# Patient Record
Sex: FEMALE | Race: WHITE
[De-identification: names, ages, dates, MRNs, and addresses within clinical notes are randomized per-mention and may not be internally consistent; named-entity substitution may affect disease eponyms.]

---

## 2020-03-25 ENCOUNTER — HOSPITAL ENCOUNTER (EMERGENCY)
Dept: HOSPITAL 62 - ER | Age: 45
Discharge: HOME | End: 2020-03-25
Payer: SELF-PAY

## 2020-03-25 VITALS — DIASTOLIC BLOOD PRESSURE: 70 MMHG | SYSTOLIC BLOOD PRESSURE: 145 MMHG

## 2020-03-25 DIAGNOSIS — N64.4: Primary | ICD-10-CM

## 2020-03-25 DIAGNOSIS — Z88.3: ICD-10-CM

## 2020-03-25 DIAGNOSIS — F17.210: ICD-10-CM

## 2020-03-25 DIAGNOSIS — Z80.3: ICD-10-CM

## 2020-03-25 PROCEDURE — 76642 ULTRASOUND BREAST LIMITED: CPT

## 2020-03-25 PROCEDURE — 99283 EMERGENCY DEPT VISIT LOW MDM: CPT

## 2020-03-25 NOTE — ER DOCUMENT REPORT
ED Medical Screen (RME)





- General


Chief Complaint: Skin Problem


Stated Complaint: BREAST PROBLEM


Time Seen by Provider: 03/25/20 15:48


Mode of Arrival: Ambulatory


Information source: Patient


Notes: 





45-year-old female presents with complaints of right breast abscess.  Reports 

she had a breast abscess approximately 1-1/2 years ago.  She reports it was 

drained in [place].  She reports 4 days ago she started having some breast 

irritation tenderness.  She reports her breast started turning red last night. 

She did soak the breast, put warm compresses on the breast and no relief of 

symptoms. She denies nipple drainage. No c/o fever, vomiting, diarrhea.  She 

reports she does not have a primary care provider and does not have insurance.











 





- Related Data


Allergies/Adverse Reactions: 


                                        





cariprazine [From Vraylar] Allergy (Verified 03/25/20 15:48)


   


clonazepam [From Klonopin] Allergy (Verified 03/25/20 15:48)


   


prednisone Allergy (Verified 03/25/20 15:48)


   


sulfamethoxazole [From Bactrim] Allergy (Verified 03/25/20 15:48)


   


trimethoprim [From Bactrim] Allergy (Verified 03/25/20 15:48)


   











Past Medical History





- General


Information source: Patient





- Social History


Cigarette use (# per day): Yes


Chew tobacco use (# tins/day): No


Frequency of alcohol use: None


Drug Abuse: None


Lives with: Family - 


Family history: Malignancy - great great aunt with breast cancer


Pulmonary Medical History: Reports: Other - emphysema


Past Surgical History: Reports: Other - right breast abscess drained





Review of Systems





- Review of Systems


Notes: 





Review HPI for review of systems., All other systems negative





Physical Exam





- Vital signs


Vitals: 


                                        











Temp Pulse Resp BP Pulse Ox


 


 98.0 F   92   18   185/96 H  100 


 


 03/25/20 15:37  03/25/20 15:37  03/25/20 15:37  03/25/20 15:37  03/25/20 15:37














- Notes


Notes: 





PHYSICAL EXAMINATION: 


GENERAL: Well-appearing and in no acute distress 


HEAD: Atraumatic, normocephalic. 


EYES: extraocular movements intact, sclera anicteric, conjunctiva are normal. 


ENT: nares patent,  Moist mucous membranes. 


NECK: Normal range of motion, supple 


LUNGS:RR even/unlabored


HEART: Regular rate 


ABDOMEN: Soft, no tenderness. 


EXTREMITIES: Normal range of motion, 


NEUROLOGICAL: Cranial nerves grossly intact.  


PSYCH: Normal mood, normal affect. 


SKIN: Warm, Dry, normal turgor, no rashes or lesions noted


Right breast ttp, no obvious breast abscess, no visual nipple discharge, no 

induration








Course





- Re-evaluation


Re-evalutation: 





03/25/20 17:15





Dr Weinstein from radiology contacted me.  There is concern about inflammatory 

tissue versus abscess..  Patient reports her great great aunt had breast cancer.

 Patient herself has never had a mammogram.  Patient was instructed on 

ultrasound report.  Instructed on follow-up for mammogram tomorrow morning.  It 

was scheduled through Maty in radiology.  Concerned about follow-up post 

mammogram.  Patient does not have a primary care provider she does not have 

insurance.  I have left a message with the culture nurse to monitor results and 

contact me.  The patient was instructed to return to ER for worsening erythema 

swelling warmth pain.





                                        





Breast Ultrasound  03/25/20 16:00


IMPRESSION:   Inflammatory tissue without obvious abscess.  Correlation with 

mammography is recommended.


 











 








- Vital Signs


Vital signs: 


                                        











Temp Pulse Resp BP Pulse Ox


 


 98.0 F   80   18   145/70 H  98 


 


 03/25/20 17:19  03/25/20 17:19  03/25/20 17:19  03/25/20 17:19  03/25/20 17:19














- Diagnostic Test


Radiology reviewed: Image reviewed, Reports reviewed





Doctor's Discharge





- Discharge


Clinical Impression: 


 breast irritation





Condition: Stable


Disposition: HOME, SELF-CARE


Instructions:  Mammography ([facility]), Oral Narcotic Medication ([facility])


Additional Instructions: 


*You have been evaluated for a right breast abscess


*The [facility]  noted a possible abscess or inflammatory response.  You need 

further evaluation via mammogram


*Monitor your breast for signs of increasing infection such as increasing pain, 

redness, swelling, warmth


*warm compresses


*Follow up with a primary care provider for referral to surgeon


*Follow up for a mammogram tomorrow at [facility] at 0845 tomorrow 

morning.  


*Return to ED for signs of worsening infection, worsening condition, concerns, 

changes, needs


Forms:  Elevated Blood Pressure, Follow-Up Radiology Testing

## 2020-03-25 NOTE — RADIOLOGY REPORT (SQ)
EXAM DESCRIPTION:  U/S BREAST UNILATERAL LIMITED



COMPLETED DATE/TIME:  3/25/2020 4:31 pm



REASON FOR STUDY:  breast abscess



COMPARISON:  None



TECHNIQUE:  Static and Realtime grayscale interrogation of focal area(s) of concern in the right danae
st(s) acquired.  Selected color doppler/spectral images saved to PACS.



LIMITATIONS:  None.



FINDINGS:  There is a subareolar hypoechoic heterogeneous area measuring about 1.5 cm in maximum diam
eter.  There is flow in the periphery and no through transmission.



IMPRESSION:   Inflammatory tissue without obvious abscess.  Correlation with mammography is recommend
ed.



BIRAD:  0 Incomplete: Need additional imaging evaluation and/or prior mammograms for comparison..



RECOMMENDATION:  RECOMMENDED FOLLOW-UP: Follow-up  as clinically indicated.



COMMENT:  The American College of Radiology (ACR) has developed recommendations for screening MRI of 
the breasts in certain patient populations, to be used in conjunction with mammography.  Breast MRI s
urveillance may be appropriate for women with more than 20% lifetime risk of developing breast cancer
  as determined by genetic testing, significant family history of the disease, or history of mantle r
adiation for Hodgkins Disease.  ACR Practice Guidelines 2008.



TECHNICAL DOCUMENTATION:  FINDING NUMBER: (1)

ASSESSMENT: (1)

JOB ID:  [id number]

 2011 [employer]- All Rights Reserved



Reading location - IP/workstation name: [email or web address]

## 2020-03-25 NOTE — ER DOCUMENT REPORT
HPI





- HPI


Patient complains to provider of: right breast abscess


Time Seen by Provider: 03/25/20 15:48


Onset: Other - 4 days


Quality of pain: Achy


Context: 





45-year-old female presents with complaints of right breast abscess.  Reports 

she had a breast abscess approximately 1-1/2 years ago.  She reports it was 

drained in [place].  She reports 4 days ago she started having some breast 

irritation tenderness.  She reports her breast started turning red last night. 

She did soak the breast, put warm compresses on the breast and no relief of 

symptoms. She denies nipple drainage. No c/o fever, vomiting, diarrhea.  She 

reports she does not have a primary care provider and does not have insurance.





Past Medical History





- General


Information source: Patient





- Social History


Smoking Status: Current Every Day Smoker


Cigarette use (# per day): Yes


Frequency of alcohol use: None


Drug Abuse: None


Family History: None


Patient has suicidal ideation: No


Patient has homicidal ideation: No


Pulmonary Medical History: Reports: Other - emphysema


Past Surgical History: Reports: Other - right breast abscess drained





Vertical Provider Document





- CONSTITUTIONAL


Agree With Documented VS: Yes


Exam Limitations: No Limitations


General Appearance: WD/WN, No Apparent Distress





- HEENT


HEENT: Atraumatic, Normocephalic





- NECK


Neck: Supple





- RESPIRATORY


Respiratory: No Respiratory Distress





- CARDIOVASCULAR


Cardiovascular: Regular Rate





- MUSCULOSKELETAL/EXTREMETIES


Musculoskeletal/Extremeties: MAEW, FROM





- NEURO


Level of Consciousness: Awake, Alert, Appropriate


Motor/Sensory: No Motor Deficit





- DERM


Integumentary: Warm, Dry, Abscess


Adult Front & Back Diagram: 


                            __________________________














                            __________________________





 1 - Patient complains of right breast pain.  Reports it feels like an abscess 

that she had 11/2 years ago.  No obvious abscess noted no erythema no swelling 

no pustule.  No drainage








Course





- Re-evaluation


Re-evalutation: 





03/25/20 16:10


Patient presents to the emergency department with complaints of right breast 

abscess.  I did evaluate the breast it does not look to have an obvious abscess.

 Ultrasound ordered





- Vital Signs


Vital signs: 


                                        











Temp Pulse Resp BP Pulse Ox


 


 98.0 F   92   18   185/96 H  100 


 


 03/25/20 15:37  03/25/20 15:37  03/25/20 15:37  03/25/20 15:37  03/25/20 15:37














Discharge





- Discharge


Clinical Impression: 


 breast irritation





Condition: Stable


Disposition: HOME, SELF-CARE


Additional Instructions: 


*You have been evaluated for a right breast abscess


*Monitor the site for signs of increasing infection such as increasing pain,


  redness, swelling, warmth


*Wash the site twice daily as discussed


*Follow up with a primary care provider in 3-5 days


*Return to ED for signs of increasing infection, worsening condition,


  changes, needs

## 2020-03-26 ENCOUNTER — HOSPITAL ENCOUNTER (OUTPATIENT)
Dept: HOSPITAL 62 - WI | Age: 45
End: 2020-03-26
Attending: NURSE PRACTITIONER
Payer: SELF-PAY

## 2020-03-26 ENCOUNTER — HOSPITAL ENCOUNTER (EMERGENCY)
Dept: HOSPITAL 62 - ER | Age: 45
Discharge: HOME | End: 2020-03-26
Payer: SELF-PAY

## 2020-03-26 VITALS — DIASTOLIC BLOOD PRESSURE: 108 MMHG | SYSTOLIC BLOOD PRESSURE: 158 MMHG

## 2020-03-26 DIAGNOSIS — Z88.8: ICD-10-CM

## 2020-03-26 DIAGNOSIS — R74.0: ICD-10-CM

## 2020-03-26 DIAGNOSIS — Z88.1: ICD-10-CM

## 2020-03-26 DIAGNOSIS — N61.0: Primary | ICD-10-CM

## 2020-03-26 DIAGNOSIS — N64.89: Primary | ICD-10-CM

## 2020-03-26 DIAGNOSIS — I10: ICD-10-CM

## 2020-03-26 LAB
ADD MANUAL DIFF: NO
ALBUMIN SERPL-MCNC: 4.7 G/DL (ref 3.5–5)
ALP SERPL-CCNC: 89 U/L (ref 38–126)
ANION GAP SERPL CALC-SCNC: 12 MMOL/L (ref 5–19)
AST SERPL-CCNC: 20 U/L (ref 14–36)
BASOPHILS # BLD AUTO: 0.1 10^3/UL (ref 0–0.2)
BASOPHILS NFR BLD AUTO: 0.9 % (ref 0–2)
BILIRUB DIRECT SERPL-MCNC: 0.3 MG/DL (ref 0–0.4)
BILIRUB SERPL-MCNC: 0.3 MG/DL (ref 0.2–1.3)
BUN SERPL-MCNC: 8 MG/DL (ref 7–20)
CALCIUM: 9.9 MG/DL (ref 8.4–10.2)
CHLORIDE SERPL-SCNC: 103 MMOL/L (ref 98–107)
CO2 SERPL-SCNC: 25 MMOL/L (ref 22–30)
EOSINOPHIL # BLD AUTO: 0.1 10^3/UL (ref 0–0.6)
EOSINOPHIL NFR BLD AUTO: 1 % (ref 0–6)
ERYTHROCYTE [DISTWIDTH] IN BLOOD BY AUTOMATED COUNT: 13.2 % (ref 11.5–14)
GLUCOSE SERPL-MCNC: 154 MG/DL (ref 75–110)
HCT VFR BLD CALC: 43.4 % (ref 36–47)
HGB BLD-MCNC: 14.4 G/DL (ref 12–15.5)
LYMPHOCYTES # BLD AUTO: 2.1 10^3/UL (ref 0.5–4.7)
LYMPHOCYTES NFR BLD AUTO: 17.1 % (ref 13–45)
MCH RBC QN AUTO: 29.4 PG (ref 27–33.4)
MCHC RBC AUTO-ENTMCNC: 33.1 G/DL (ref 32–36)
MCV RBC AUTO: 89 FL (ref 80–97)
MONOCYTES # BLD AUTO: 1 10^3/UL (ref 0.1–1.4)
MONOCYTES NFR BLD AUTO: 8 % (ref 3–13)
NEUTROPHILS # BLD AUTO: 9.1 10^3/UL (ref 1.7–8.2)
NEUTS SEG NFR BLD AUTO: 73 % (ref 42–78)
PLATELET # BLD: 262 10^3/UL (ref 150–450)
POTASSIUM SERPL-SCNC: 4.1 MMOL/L (ref 3.6–5)
PROT SERPL-MCNC: 7.7 G/DL (ref 6.3–8.2)
RBC # BLD AUTO: 4.89 10^6/UL (ref 3.72–5.28)
TOTAL CELLS COUNTED % (AUTO): 100 %
WBC # BLD AUTO: 12.5 10^3/UL (ref 4–10.5)

## 2020-03-26 PROCEDURE — 80053 COMPREHEN METABOLIC PANEL: CPT

## 2020-03-26 PROCEDURE — 96360 HYDRATION IV INFUSION INIT: CPT

## 2020-03-26 PROCEDURE — 96361 HYDRATE IV INFUSION ADD-ON: CPT

## 2020-03-26 PROCEDURE — 87040 BLOOD CULTURE FOR BACTERIA: CPT

## 2020-03-26 PROCEDURE — 36415 COLL VENOUS BLD VENIPUNCTURE: CPT

## 2020-03-26 PROCEDURE — S0119 ONDANSETRON 4 MG: HCPCS

## 2020-03-26 PROCEDURE — 99283 EMERGENCY DEPT VISIT LOW MDM: CPT

## 2020-03-26 PROCEDURE — 85025 COMPLETE CBC W/AUTO DIFF WBC: CPT

## 2020-03-26 PROCEDURE — 83605 ASSAY OF LACTIC ACID: CPT

## 2020-03-26 PROCEDURE — 77066 DX MAMMO INCL CAD BI: CPT

## 2020-03-26 PROCEDURE — 96372 THER/PROPH/DIAG INJ SC/IM: CPT

## 2020-03-26 RX ADMIN — SODIUM CHLORIDE, SODIUM LACTATE, POTASSIUM CHLORIDE, AND CALCIUM CHLORIDE PRN MLS/HR: .6; .31; .03; .02 INJECTION, SOLUTION INTRAVENOUS at 13:53

## 2020-03-26 RX ADMIN — SODIUM CHLORIDE, SODIUM LACTATE, POTASSIUM CHLORIDE, AND CALCIUM CHLORIDE PRN MLS/HR: .6; .31; .03; .02 INJECTION, SOLUTION INTRAVENOUS at 14:36

## 2020-03-26 NOTE — ER DOCUMENT REPORT
ED General





- General


Chief Complaint: Breast Lump


Stated Complaint: BREAST PAIN


Time Seen by Provider: 03/26/20 12:17


Primary Care Provider: 


PHAM DAVIS NP [Primary Care Provider] - Follow up as needed


Notes: 





CHIEF COMPLAINT: Right breast pain and redness





HPI: 45-year-old female presenting to the emergency department complaining of 4 

days of right breast pain and swelling.  No definitive fever.  Patient states 

that she has had an abscess in this breast previously.  Reports increasing pain.

 States she was seen in the emergency department yesterday, had an ultrasound 

that was negative for abscess, was scheduled for a mammogram this morning which 

she did have and states that she was told she would likely need to see a breast 

surgeon for biopsies, there was no fluid collection noted.  Patient now presenti

 for reevaluation reporting increased swelling and pain.  States she was not 

sent out on antibiotics yesterday





ROS: See HPI - all other systems were reviewed and are otherwise negative


Constitutional: no fever 


Eyes: no drainage, no blurred vision


ENT: no runny nose, no sore throat


Cardiovascular:  no chest pain 


Resp: no SOB, no cough


GI: no vomiting, no diarrhea, no abdominal pain


: no dysuria


Integumentary: Positive right breast pain


Allergy: no hives 


Musculoskeletal: no extremity pain or swelling 


Neurological: no numbness/tingling, no weakness





MEDICATIONS: I agree with the patient medications as charted by the RN.





ALLERGIES: I agree with the allergies as charted by the RN.





PAST MEDICAL HISTORY/PAST SURGICAL HISTORY: Reviewed and agree as charted by RN.





SOCIAL HISTORY: Reviewed and agree as charted by RN.





FAMILY HISTORY: No significant familial comorbid conditions directly related to 

patient complaint





EXAM: With female chaperone present


Reviewed vital signs as charted by RN.


CONSTITUTIONAL: Alert and oriented and responds appropriately to questions. 

Well-appearing; well-nourished


HEAD: Normocephalic; atraumatic


EYES: Conjunctivae clear, sclerae non-icteric


ENT: normal nose; no rhinorrhea; moist mucous membranes; pharynx without lesions

noted


NECK: Supple without meningismus; non-tender; no cervical lymphadenopathy, no 

masses


CARD: RRR; no murmurs, no clicks, no rubs, no gallops; symmetric distal pulses


RESP: Normal chest excursion without splinting or tachypnea; breath sounds clear

and equal bilaterally; no wheezes, no rhonchi, no rales, pulse oximetry 98% on 

room air not hypoxic


BREAST: Right breast was examined.  There is some erythema to the upper aspect 

of the nipple and areola extending from 12:00 through 3:00.  Slight induration 

no definitive fluctuance.  There is moderate tenderness through this area.  

Indurated region measures approximately 2.5 cm x 1 cm


ABD/GI: Normal bowel sounds; non-distended; soft, non-tender.


BACK:  The back appears normal and is non-tender to palpation, there is no CVA 

tenderness


EXT: Normal ROM in all joints; non-tender to palpation; no cyanosis, no 

effusions, no edema   


SKIN: Normal color for age and race; warm; dry; good turgor


NEURO: Moves all extremities equally; Motor and sensory function intact 


PSYCH: The patient's mood and manner are appropriate. Grooming and personal 

hygiene are appropriate.





MDM: 45-year-old female with increasing right breast redness and tenderness.  

Patient had ultrasound yesterday which did not show fluid collection or abscess.

 Patient is afebrile.  Patient had a mammogram today which showed thickening of 

the skin over the upper aspect of the nipple.  This was concerning for Paget's 

disease or superficial cellulitis.  Patient has had an abscess in the right 

breast previously.  Symptoms have been ongoing for only 4 days.  More likely 

this is a superficial cellulitis.  I will treat patient with Rocephin and 

clindamycin in the emergency department keep patient on clindamycin.  We have 

been in touch with the  given the patient's insurance status to 

ensure the patient will have appropriate follow-up with surgery for biopsy if 

needed.  Patient will take the antibiotics for the next 10 days, will return in 

24 to 48 hours for recheck if breast redness or pain worsen.  She is aware of 

the concern for cancers hence the close follow-up





- Related Data


Allergies/Adverse Reactions: 


                                        





cariprazine [From Vraylar] Allergy (Verified 03/25/20 15:48)


   


clonazepam [From Klonopin] Allergy (Verified 03/25/20 15:48)


   


prednisone Allergy (Verified 03/25/20 15:48)


   


sulfamethoxazole [From Bactrim] Allergy (Verified 03/25/20 15:48)


   


trimethoprim [From Bactrim] Allergy (Verified 03/25/20 15:48)


   











Past Medical History





- Social History


Smoking Status: Unknown if Ever Smoked


Family History: Reviewed & Not Pertinent


Patient has suicidal ideation: No


Patient has homicidal ideation: No


Past Surgical History: Reports: Other - right breast abscess drained





Course





- Laboratory


Result Diagrams: 


                                 03/26/20 12:50





                                 03/26/20 12:50


Laboratory results interpreted by me: 


                                        











  03/26/20





  12:50


 


WBC  12.5 H


 


Absolute Neuts (auto)  9.1 H














Discharge





- Discharge


Clinical Impression: 


 Cellulitis of right breast





Condition: Stable


Disposition: HOME, SELF-CARE


Additional Instructions: 


Continue warm compresses frequently to the right breast.  Pain medications as 

prescribed no driving if taking narcotics for pain.  Take the antibiotics as 

prescribed.  Return in 24 to 48 hours if you have worsening redness or pain to 

the breast as discussed.  Follow-up for further evaluation and possible biopsy 

to rule out cancer has also been recommended


Prescriptions: 


Clindamycin HCl 300 mg PO QID #28 capsule


Oxycodone HCl/Acetaminophen [Percocet 5-325 mg Tablet] 1 tab PO Q4H PRN #15 tab


 PRN Reason: 


Referrals: 


GEMMA BURNETT MD [ACTIVE STAFF] - Follow up as needed


ISRAEL GALDAMEZ RN [REGISTERED NURSE-CASE MANGER] - Follow up as needed

## 2020-03-26 NOTE — ER DOCUMENT REPORT
ED Medical Screen (RME)





- General


Stated Complaint: BREAST PAIN


Time Seen by Provider: 03/26/20 12:17


Primary Care Provider: 


PHAM DAVIS NP [Primary Care Provider] - Follow up as needed





- HPI


Notes: 





03/26/20 12:21


45-year-old female presents emergency room with complaints of right breast pain 

that has "doubled in size" since being seen yesterday in the emergency room.  

Patient did receive an ultrasound as well as a diagnostic mammogram of her right

breast which there is concern for DCI, she is pending a punch breast biopsy with

a surgeon for further evaluation.  Patient states that her pain is 9 out of 10, 

sharp and stabbing and swelling has become progressively worse in the last 24 

hours.  Denies any nipple drainage.  Patient did not have any laboratory test 

done yesterday.  Denies any fevers or chills





I have greeted and performed a rapid initial assessment of this patient.  A 

comprehensive ED assessment and evaluation of the patient, analysis of test 

results and completion of the medical decision making process will be conducted 

by additional ED providers.





PHYSICAL EXAMINATION:





GENERAL: Well-appearing, well-nourished and in mild distress


CV: s1, s2 regular 





LUNGS: No respiratory distress





SKIN: Warm, Dry, normal turgor, no rashes or lesions noted. right breast with 

erythema, warmth to touch and swelling at 12 o'clock to 3 o' clock. 





- Related Data


Allergies/Adverse Reactions: 


                                        





cariprazine [From Vraylar] Allergy (Verified 03/25/20 15:48)


   


clonazepam [From Klonopin] Allergy (Verified 03/25/20 15:48)


   


prednisone Allergy (Verified 03/25/20 15:48)


   


sulfamethoxazole [From Bactrim] Allergy (Verified 03/25/20 15:48)


   


trimethoprim [From Bactrim] Allergy (Verified 03/25/20 15:48)


   











Past Medical History





- Social History


Family history: Malignancy - great great aunt with breast cancer


Past Surgical History: Reports: Other - right breast abscess drained





Doctor's Discharge





- Discharge


Referrals: 


PHAM DAVIS NP [Primary Care Provider] - Follow up as needed

## 2020-03-26 NOTE — WOMENS IMAGING REPORT
EXAM DESCRIPTION:  BILAT DIAGNOSTIC MAMMO W/CAD



IMAGES COMPLETED DATE/TIME:  3/26/2020 9:13 am



REASON FOR STUDY:  BREAST PAIN N63.10  UNSPECIFIED LUMP IN THE RIGHT BREAST, UNSPECIFIED BRANDI



COMPARISON:  Breast ultrasound 3/25/2020



EXAM PARAMETERS:  Standard craniocaudal and mediolateral oblique views of each breast recorded using 
digital acquisition.

Additional right breast 90 mediolateral view

Read with the assistance of CAD:

.[facility]  Version 9.2



LIMITATIONS:  None.



FINDINGS:  RIGHT BREAST

MASSES: No suspicious masses.

CALCIFICATIONS: No new or suspicious calcifications.

ARCHITECTURAL DISTORTION: None.

ASYMMETRY: None noted.

OTHER: There is diffuse right periareolar and areolar skin thickening.  This could be seen in Paget's
 disease of the nipple or with superficial cellulitis.  Consultation with a breast surgeon is recomme
nded.  This finding was discussed with Richelle Shin in the emergency room.

LEFT BREAST

MASSES: No suspicious masses.

CALCIFICATIONS: No new or suspicious calcifications.

ARCHITECTURAL DISTORTION: None.

ASYMMETRY: None noted.

OTHER: No other significant finding.



IMPRESSION:  Abnormal right breast periareolar and areolar skin thickening.  Paget's disease the nipp
le may be present.  Skin punch biopsy is recommended with breast surgery consult



BREAST DENSITY:  b. There are scattered areas of fibroglandular density.



BIRAD:  ASSESSMENT:  4 Suspicious. Biopsy should be performed in the absence of clinical contra-indic
ation.



RECOMMENDATION:  RECOMMENDED FOLLOW UP: Birads 4: Biopsy should be performed in the absence of clinic
al contraindication.

SPECIFIC INTERVENTION/IMAGING/CONSULTATION RECOMMENDED:Skin punch biopsy and breast surgery cpnsult

COMMUNICATION:These results were discussed with Richelle Shin in the emergency room who will arrange 
follow-up



COMMENT:  The patient has been notified of the results by letter per SA requirements. Additional no
tification policies are in place for contacting patient with suspicious or incomplete findings.

Quality ID #225: The American College of Radiology recommends an annual screening mammogram for women
 aged 40 years or over. This facility utilizes a reminder system to ensure that all patients receive 
reminder letters, and/or direct phone calls for appointments. This includes reminders for routine scr
eening mammograms, diagnostic mammograms, or other Breast Imaging Interventions when appropriate.  Th
is patient will be placed in the appropriate reminder system.



TECHNICAL DOCUMENTATION:  FINDING NUMBER: (1)

ASSESSMENT: (1)

JOB ID:  [id number]

 2011 [employer]- All Rights Reserved



Reading location - IP/workstation name: [id number]

## 2020-03-26 NOTE — ER DOCUMENT REPORT
Doctor's Note


Notes: 





03/26/20 09:33


Dr. Victor, radiologist, made me aware that she needs right breast punch 

biopsy for concern of DCI and needs follow up with Dr. Laney Calvillo or Dr. Welch, surgeon.  Maggie King RN, made aware that needs close follow up. 

Discussed this at 0936, pt will be notified to schedule.

## 2021-01-19 ENCOUNTER — HOSPITAL ENCOUNTER (OUTPATIENT)
Dept: HOSPITAL 62 - RDC | Age: 46
End: 2021-01-19
Attending: NURSE PRACTITIONER
Payer: MEDICAID

## 2021-01-19 VITALS — SYSTOLIC BLOOD PRESSURE: 128 MMHG | DIASTOLIC BLOOD PRESSURE: 77 MMHG

## 2021-01-19 DIAGNOSIS — F17.210: ICD-10-CM

## 2021-01-19 DIAGNOSIS — R09.89: ICD-10-CM

## 2021-01-19 DIAGNOSIS — R50.9: ICD-10-CM

## 2021-01-19 DIAGNOSIS — Z20.822: Primary | ICD-10-CM

## 2021-01-19 DIAGNOSIS — J02.9: ICD-10-CM

## 2021-01-19 DIAGNOSIS — J06.9: ICD-10-CM

## 2021-01-19 LAB
A TYPE INFLUENZA AG: NEGATIVE
B INFLUENZA AG: NEGATIVE

## 2021-01-19 PROCEDURE — 87070 CULTURE OTHR SPECIMN AEROBIC: CPT

## 2021-01-19 PROCEDURE — 99211 OFF/OP EST MAY X REQ PHY/QHP: CPT

## 2021-01-19 PROCEDURE — 87804 INFLUENZA ASSAY W/OPTIC: CPT

## 2021-01-19 PROCEDURE — 99202 OFFICE O/P NEW SF 15 MIN: CPT

## 2021-01-19 PROCEDURE — 87880 STREP A ASSAY W/OPTIC: CPT

## 2021-01-19 PROCEDURE — 87635 SARS-COV-2 COVID-19 AMP PRB: CPT

## 2021-01-19 PROCEDURE — C9803 HOPD COVID-19 SPEC COLLECT: HCPCS

## 2021-01-19 NOTE — ER RDC ASSESSMENT REPORT
Intake





- In the Last 14 days


Have you traveled outside [place]?: No


Have you been in close contact with someone CONFIRMED: No


Worked in Healthcare?: No





- Symptoms


Subjective Fever(Felt feverish): Yes


Chills: No


Muscule Aches: No


Runny Nose: Yes


Sore Throat: Yes


Cough (New or worsening chronic cough): No


Shortness of breath: No


Nausea or Vomiting: No


Headache: No


Abdominal Pain: No


Diarrhea(3 or more loose stools in last 24 hours): No





- Do you have any of the following


Chronic lung disease: Asthma or emphysema or COPD: Yes


Cystic Fibrosis: No


Diabetes: No


High Blood Pressure: No


Cardiovascular Disease: No


Chronic Kidney Disease: No


Chronic Liver Disease: No


Chronic blood disorder like Sickle Cell Disease: No


Weak immune system due to disease or medication: No


Neurologic condition that limits movement: No


Developmental delay - Moderate to Severe: No


Recent (within past 2 weeks) or current Pregnancy: No


Morbid Obesity (>100 pounds over ideal weight): No





- Objective


Temperature: 98.7 F


Pulse Rate: 75


Respiratory Rate: 17


Blood Pressure: 128/77


O2 Sat by Pulse Oximetry: 99


Objective: 


Given above, testing performed: 





flu, strep, covid





General





- General


Stated Complaint: sore throat


Time Seen by Provider: 01/19/21 12:00


Mode of Arrival: Ambulatory


Information source: Patient





- HPI


Notes: 





45-year-old female presents to [facility] clinic for COVID-19 testing.  Patient reports

no known contact with Covid positive individual.  Symptom onset 1/14/2021.  Esha

ent is reporting subjective fever (she does not have thermometer), runny nose, 

sore throat.  Denies any chills, muscle aches, change in taste or smell, cough 

or shortness of breath, headache, or GI upset.





- Related Data


Allergies/Adverse Reactions: 


                                        





cariprazine [From Vraylar] Allergy (Verified 03/25/20 15:48)


   


clonazepam [From Klonopin] Allergy (Verified 03/25/20 15:48)


   


prednisone Allergy (Verified 03/25/20 15:48)


   


sulfamethoxazole [From Bactrim] Allergy (Verified 03/25/20 15:48)


   


trimethoprim [From Bactrim] Allergy (Verified 03/25/20 15:48)


   











Past Medical History





- General


Information source: Patient





- Social History


Smoking Status: Current Every Day Smoker


Cigarette use (# per day): Yes - 20-30


Family History: Reviewed & Not Pertinent





- Past Medical History


Cardiac Medical History: Reports: None


Pulmonary Medical History: Reports: Hx COPD


EENT Medical History: Reports: None


Neurological Medical History: Reports: None


Endocrine Medical History: Reports: None


Renal/ Medical History: Reports: None


Malignancy Medical History: Reports: None


GI Medical History: Reports: None


Musculoskeletal Medical History: Reports None


Skin Medical History: Reports None


Psychiatric Medical History: Reports: None


Traumatic Medical History: Reports: None


Infectious Medical History: Reports: None


Past Surgical History: Reports: Hx Adenoidectomy, Hx Breast Surgery, Hx 

Tonsillectomy, Hx Tubal Ligation, Other - right breast abscess drained





Physical Exam





- General


General appearance: Appears well, Alert


In distress: None


Notes: 





PHYSICAL EXAMINATION: 


GENERAL: Well-appearing and in no acute distress. 


HEAD: Atraumatic, normocephalic. 


EYES: sclera anicteric, conjunctiva are normal. 


ENT: nares patent. Moist mucous membranes. 


NECK: Normal range of motion, supple without lymphadenopathy. 


LUNGS: No increased work of breathing. Lung sounds CTAB and equal. No wheezes 

rales or rhonchi. 


HEART: Regular rate and rhythm without murmurs.


ABDOMEN: Soft, nontender, normal bowel sounds, no guarding. 


EXTREMITIES: Normal range of motion, no pitting edema. No cyanosis. 


NEUROLOGICAL: A&O x 3. Normal speech. 


PSYCH: Normal mood, normal affect. 


SKIN: Warm, Dry, normal turgor, no rashes or lesions noted








Patient Education/Counseling


Counseling/Education: 





Patient presents with symptoms associated with possible Covid 19 infection.  

Patient does not have emergency worrying symptoms such as difficulty breathing, 

shortness of breath, chest pain, pressure, confusion or cyanosis.  Patient 

appears suitable for discharge as vital signs are stable and patient is nontoxic

in appearance.  Good return precautions have been discussed with patient, 

patient verbalized understanding and is agreeable with discharge plan of care at

this time.


Guidance for worsening S/SX: 


As a person under investigation for Covid 19, the [facility], division of public health advises you to adhere to 

the following guidance until your test results are reported to you.  If your 

test result is positive, you will receive additional information from your 

provider and your local health department at that time.


 


Remain at home until you are cleared by the health provider or public health 

authorities.


 


Keep a log of visitors to your home, notify any visitors to your home of your 

isolation status.


 


If you plan to move to a new address or leave the county, notify the local 

health department in your County.


 


Call your doctor or seek care if you have an urgent medical need.  Before 

seeking medical care, call ahead to get instructions from the provider before 

arriving at the medical office clinic or hospital.  Notify them that you are 

being tested for the virus that causes Covid 19 so that arrangements can be 

made, as necessary, to prevent transmission to others in the healthcare setting.

 Next, notify the local health department in your county.


 


If a medical emergency arises and you need to call 911, inform the first 

responders that you are being tested for the virus that causes Covid 19.  Next, 

notify the local health department in your county.





RDC Discharge





- Discharge


Clinical Impression: 


 Encounter for screening laboratory testing for COVID-19 virus





Upper respiratory infection


Qualifiers:


 URI type: unspecified URI Qualified Code(s): J06.9 - Acute upper respiratory 

infection, unspecified





Condition: Good


Disposition: Home; Selfcare